# Patient Record
Sex: FEMALE | Race: WHITE | NOT HISPANIC OR LATINO | Employment: OTHER | URBAN - NONMETROPOLITAN AREA
[De-identification: names, ages, dates, MRNs, and addresses within clinical notes are randomized per-mention and may not be internally consistent; named-entity substitution may affect disease eponyms.]

---

## 2023-10-31 PROBLEM — M54.31 RIGHT SIDED SCIATICA: Status: ACTIVE | Noted: 2023-10-31

## 2023-10-31 PROBLEM — F17.200 TOBACCO USE DISORDER: Status: ACTIVE | Noted: 2023-10-31

## 2023-10-31 PROBLEM — R42 VERTIGO: Status: ACTIVE | Noted: 2023-10-31

## 2023-10-31 PROBLEM — N94.6 PAINFUL MENSTRUATION: Status: ACTIVE | Noted: 2023-10-31

## 2023-10-31 PROBLEM — R06.00 DYSPNEA: Status: ACTIVE | Noted: 2023-10-31

## 2023-10-31 PROBLEM — K58.9 IRRITABLE BOWEL SYNDROME: Status: ACTIVE | Noted: 2023-10-31

## 2023-10-31 PROBLEM — E66.9 OBESITY (BMI 30-39.9): Status: ACTIVE | Noted: 2023-10-31

## 2023-10-31 PROBLEM — I44.7 LEFT BUNDLE-BRANCH BLOCK: Status: ACTIVE | Noted: 2023-10-31

## 2023-10-31 PROBLEM — N20.0 NEPHROLITHIASIS: Status: ACTIVE | Noted: 2023-10-31

## 2023-10-31 PROBLEM — I10 PRIMARY HYPERTENSION: Status: ACTIVE | Noted: 2023-10-31

## 2023-10-31 PROBLEM — N20.1 URETERIC STONE: Status: ACTIVE | Noted: 2023-10-31

## 2023-10-31 PROBLEM — I49.3 PREMATURE VENTRICULAR CONTRACTIONS: Status: ACTIVE | Noted: 2023-10-31

## 2023-10-31 PROBLEM — L20.9 ATOPIC DERMATITIS: Status: ACTIVE | Noted: 2023-10-31

## 2023-10-31 PROBLEM — B36.0 TINEA VERSICOLOR: Status: ACTIVE | Noted: 2023-10-31

## 2023-10-31 PROBLEM — I10 BENIGN ESSENTIAL HYPERTENSION: Status: ACTIVE | Noted: 2023-10-31

## 2023-10-31 PROBLEM — E55.9 VITAMIN D DEFICIENCY: Status: ACTIVE | Noted: 2023-10-31

## 2023-10-31 RX ORDER — NAPROXEN 500 MG/1
TABLET ORAL
COMMUNITY
End: 2023-11-01 | Stop reason: ALTCHOICE

## 2023-10-31 RX ORDER — ONDANSETRON 4 MG/1
TABLET, FILM COATED ORAL
COMMUNITY
End: 2023-11-01 | Stop reason: ALTCHOICE

## 2023-10-31 RX ORDER — MECLIZINE HYDROCHLORIDE 25 MG/1
TABLET ORAL
COMMUNITY
Start: 2015-07-16 | End: 2023-11-01 | Stop reason: ALTCHOICE

## 2023-10-31 RX ORDER — LISINOPRIL 10 MG/1
TABLET ORAL
COMMUNITY
Start: 2013-10-23 | End: 2023-11-01 | Stop reason: SDUPTHER

## 2023-10-31 RX ORDER — OXYCODONE AND ACETAMINOPHEN 5; 325 MG/1; MG/1
TABLET ORAL
COMMUNITY
End: 2023-11-01 | Stop reason: ALTCHOICE

## 2023-10-31 RX ORDER — NORGESTIMATE AND ETHINYL ESTRADIOL 0.25-0.035
KIT ORAL
COMMUNITY
Start: 2013-09-29 | End: 2023-11-01 | Stop reason: ALTCHOICE

## 2023-10-31 RX ORDER — MEXILETINE HYDROCHLORIDE 150 MG/1
1 CAPSULE ORAL 2 TIMES DAILY
COMMUNITY
Start: 2014-04-29 | End: 2023-11-01 | Stop reason: ALTCHOICE

## 2023-10-31 RX ORDER — KETOCONAZOLE 200 MG/1
1 TABLET ORAL DAILY
COMMUNITY
Start: 2015-05-12 | End: 2023-11-01 | Stop reason: ALTCHOICE

## 2023-11-01 ENCOUNTER — OFFICE VISIT (OUTPATIENT)
Dept: PRIMARY CARE | Facility: CLINIC | Age: 48
End: 2023-11-01
Payer: COMMERCIAL

## 2023-11-01 VITALS
DIASTOLIC BLOOD PRESSURE: 82 MMHG | SYSTOLIC BLOOD PRESSURE: 146 MMHG | HEART RATE: 97 BPM | BODY MASS INDEX: 41.24 KG/M2 | WEIGHT: 271.2 LBS

## 2023-11-01 DIAGNOSIS — B35.1 ONYCHOMYCOSIS OF GREAT TOE: ICD-10-CM

## 2023-11-01 DIAGNOSIS — Z12.11 ENCOUNTER FOR SCREENING FOR MALIGNANT NEOPLASM OF COLON: ICD-10-CM

## 2023-11-01 DIAGNOSIS — I10 BENIGN ESSENTIAL HYPERTENSION: Primary | ICD-10-CM

## 2023-11-01 PROCEDURE — 99214 OFFICE O/P EST MOD 30 MIN: CPT | Performed by: FAMILY MEDICINE

## 2023-11-01 PROCEDURE — 3079F DIAST BP 80-89 MM HG: CPT | Performed by: FAMILY MEDICINE

## 2023-11-01 PROCEDURE — 3077F SYST BP >= 140 MM HG: CPT | Performed by: FAMILY MEDICINE

## 2023-11-01 RX ORDER — TAVABOROLE TOPICAL SOLUTION, 5% 43.5 MG/ML
1 SOLUTION TOPICAL DAILY
Qty: 4 ML | Refills: 5 | Status: SHIPPED | OUTPATIENT
Start: 2023-11-01 | End: 2024-05-17 | Stop reason: ALTCHOICE

## 2023-11-01 RX ORDER — LISINOPRIL 10 MG/1
10 TABLET ORAL DAILY
Qty: 90 TABLET | Refills: 1 | Status: SHIPPED | OUTPATIENT
Start: 2023-11-01 | End: 2024-05-15

## 2023-11-01 ASSESSMENT — ENCOUNTER SYMPTOMS
APPETITE CHANGE: 0
DEPRESSION: 0
COUGH: 0
LIGHT-HEADEDNESS: 0
HEADACHES: 0
NAUSEA: 0
ABDOMINAL PAIN: 0
CONSTIPATION: 0
DIFFICULTY URINATING: 0
ACTIVITY CHANGE: 0
DIZZINESS: 0
DIARRHEA: 0
ANAL BLEEDING: 0
WOUND: 0
OCCASIONAL FEELINGS OF UNSTEADINESS: 0
PALPITATIONS: 0
SHORTNESS OF BREATH: 0
LOSS OF SENSATION IN FEET: 0

## 2023-11-01 ASSESSMENT — PATIENT HEALTH QUESTIONNAIRE - PHQ9
SUM OF ALL RESPONSES TO PHQ9 QUESTIONS 1 AND 2: 0
1. LITTLE INTEREST OR PLEASURE IN DOING THINGS: NOT AT ALL
2. FEELING DOWN, DEPRESSED OR HOPELESS: NOT AT ALL

## 2023-11-01 ASSESSMENT — PAIN SCALES - GENERAL: PAINLEVEL: 0-NO PAIN

## 2023-11-01 NOTE — PROGRESS NOTES
Subjective   Patient ID: Liat Boyd is a 48 y.o. female who presents for 6 mo follow up.    She presents today for her regular follow-up for her high blood pressure.  She states that she does check her blood pressure at home fairly regularly and is usually around 120/80.  She states today, because of this, she is worried because she has a new  out there.  She thinks that may be affecting this some.  Her blood pressure was originally 150/93 when she arrived but came down to the current level.    She remains active.  She works out regularly.    She does have some thickened toenail on the left great toenail and would like treatment for this.  Is become a bit painful since has become so thick.    She is up-to-date on her mammogram.  She is not yet had a colonoscopy.  She has no family history of colon cancer.  No constipation, diarrhea, or blood in the stool.           Review of Systems   Constitutional:  Negative for activity change and appetite change.   HENT:  Negative for congestion.    Eyes:  Negative for visual disturbance.   Respiratory:  Negative for cough and shortness of breath.    Cardiovascular:  Negative for chest pain, palpitations and leg swelling.   Gastrointestinal:  Negative for abdominal pain, anal bleeding, constipation, diarrhea and nausea.   Genitourinary:  Negative for difficulty urinating.   Skin:  Negative for rash and wound.   Neurological:  Negative for dizziness, light-headedness and headaches.       Objective   /82   Pulse 97   Wt 123 kg (271 lb 3.2 oz)   BMI 41.24 kg/m²     Physical Exam  Constitutional:       Appearance: Normal appearance.   Neck:      Thyroid: No thyromegaly or thyroid tenderness.      Vascular: No carotid bruit.   Cardiovascular:      Rate and Rhythm: Normal rate and regular rhythm.      Heart sounds: No murmur heard.  Pulmonary:      Effort: Pulmonary effort is normal.      Breath sounds: Normal breath sounds.   Musculoskeletal:      Cervical  back: Neck supple.   Skin:     Comments: Her left great toenail is thickened quite a bit and yellow.  No surrounding redness.   Neurological:      Mental Status: She is alert.   Psychiatric:         Mood and Affect: Mood normal.         Assessment/Plan   Diagnoses and all orders for this visit:  Benign essential hypertension  -     Comprehensive Metabolic Panel; Future  -     Lipid Panel; Future  -     lisinopril 10 mg tablet; Take 1 tablet (10 mg) by mouth once daily.  Onychomycosis of great toe  -     tavaborole 5 % solution with applicator; Apply 1 Application topically once daily.  Encounter for screening for malignant neoplasm of colon  -     Cologuard® colon cancer screening; Future  Overall, doing well.  She will continue her regular physical activity.  She will continue to follow her blood pressures at home and call me if they are consistently above 140/90.  Sounds like the weather and her new  have her a little bit on edge today.    We used the medication for her toenails.  She will let me know if that not effective.  I did tell her that this will take many months usually to resolve this but to expect it to clear from the base out.  If labs not covered may consider using Diflucan.    We will do the Cologuard since she has no family history of colon cancer and is low risk.  Her mammograms are up-to-date, she states.

## 2023-11-01 NOTE — PATIENT INSTRUCTIONS
You bP is higher than your goal of <140/90. You've had some weather stress today. Follow at home and call me if higher than that value.     UP to date on mammograms. I'll send the cologuard test.     Use the medication on your toenails daily. Expect to see them clearing from the base and moving out.

## 2023-11-10 ENCOUNTER — HOSPITAL ENCOUNTER (OUTPATIENT)
Dept: RADIOLOGY | Facility: HOSPITAL | Age: 48
Discharge: HOME | End: 2023-11-10
Payer: COMMERCIAL

## 2023-11-10 ENCOUNTER — TELEPHONE (OUTPATIENT)
Dept: PRIMARY CARE | Facility: CLINIC | Age: 48
End: 2023-11-10
Payer: COMMERCIAL

## 2023-11-10 DIAGNOSIS — R92.8 ABNORMAL MAMMOGRAM OF RIGHT BREAST: Primary | ICD-10-CM

## 2023-11-10 DIAGNOSIS — R92.8 OTHER ABNORMAL AND INCONCLUSIVE FINDINGS ON DIAGNOSTIC IMAGING OF BREAST: ICD-10-CM

## 2023-11-10 PROCEDURE — 76642 ULTRASOUND BREAST LIMITED: CPT | Mod: RT

## 2023-11-10 NOTE — TELEPHONE ENCOUNTER
I called her and left message that her mammogram was benign, but that they still recommend a recheck of the right breast in 6 months. I will enter the order. I asked her to call back to confirm this.

## 2024-03-28 ENCOUNTER — TELEPHONE (OUTPATIENT)
Dept: PRIMARY CARE | Facility: CLINIC | Age: 49
End: 2024-03-28
Payer: COMMERCIAL

## 2024-03-28 DIAGNOSIS — Z12.39 BREAST SCREENING: Primary | ICD-10-CM

## 2024-03-28 DIAGNOSIS — R92.8 ABNORMAL MAMMOGRAM OF RIGHT BREAST: ICD-10-CM

## 2024-03-28 NOTE — TELEPHONE ENCOUNTER
Patient calls, she was supposed to have had additional testing following her mammogram, she did not do it, now she is due for annual mammogram and would like to have the order placed.

## 2024-04-01 NOTE — TELEPHONE ENCOUNTER
Message left for patient to call this office   What Is The Reason For Today's Visit?: History of Melanoma Year Excised?: 2012, 2020

## 2024-05-10 ENCOUNTER — APPOINTMENT (OUTPATIENT)
Dept: RADIOLOGY | Facility: HOSPITAL | Age: 49
End: 2024-05-10
Payer: COMMERCIAL

## 2024-05-11 DIAGNOSIS — I10 BENIGN ESSENTIAL HYPERTENSION: ICD-10-CM

## 2024-05-13 ENCOUNTER — HOSPITAL ENCOUNTER (OUTPATIENT)
Dept: RADIOLOGY | Facility: HOSPITAL | Age: 49
Discharge: HOME | End: 2024-05-13
Payer: COMMERCIAL

## 2024-05-13 VITALS — WEIGHT: 265 LBS | HEIGHT: 68 IN | BODY MASS INDEX: 40.16 KG/M2

## 2024-05-13 DIAGNOSIS — R92.8 ABNORMAL MAMMOGRAM OF RIGHT BREAST: ICD-10-CM

## 2024-05-13 DIAGNOSIS — R92.8 ABNORMAL FINDING ON BREAST IMAGING: ICD-10-CM

## 2024-05-13 PROCEDURE — 77061 BREAST TOMOSYNTHESIS UNI: CPT | Performed by: RADIOLOGY

## 2024-05-13 PROCEDURE — 76642 ULTRASOUND BREAST LIMITED: CPT | Performed by: RADIOLOGY

## 2024-05-13 PROCEDURE — 77066 DX MAMMO INCL CAD BI: CPT | Performed by: RADIOLOGY

## 2024-05-13 PROCEDURE — 76982 USE 1ST TARGET LESION: CPT | Mod: RT

## 2024-05-13 PROCEDURE — 76642 ULTRASOUND BREAST LIMITED: CPT | Mod: RT

## 2024-05-13 PROCEDURE — 77062 BREAST TOMOSYNTHESIS BI: CPT

## 2024-05-15 RX ORDER — LISINOPRIL 10 MG/1
10 TABLET ORAL DAILY
Qty: 90 TABLET | Refills: 1 | Status: SHIPPED | OUTPATIENT
Start: 2024-05-15

## 2024-05-17 ENCOUNTER — OFFICE VISIT (OUTPATIENT)
Dept: PRIMARY CARE | Facility: CLINIC | Age: 49
End: 2024-05-17
Payer: COMMERCIAL

## 2024-05-17 VITALS
HEIGHT: 67 IN | WEIGHT: 278.6 LBS | OXYGEN SATURATION: 97 % | SYSTOLIC BLOOD PRESSURE: 138 MMHG | DIASTOLIC BLOOD PRESSURE: 88 MMHG | BODY MASS INDEX: 43.73 KG/M2 | HEART RATE: 104 BPM

## 2024-05-17 DIAGNOSIS — R53.83 OTHER FATIGUE: ICD-10-CM

## 2024-05-17 DIAGNOSIS — F17.200 SMOKER: ICD-10-CM

## 2024-05-17 DIAGNOSIS — Z00.00 WELLNESS EXAMINATION: Primary | ICD-10-CM

## 2024-05-17 DIAGNOSIS — I10 BENIGN ESSENTIAL HYPERTENSION: ICD-10-CM

## 2024-05-17 DIAGNOSIS — Z12.11 ENCOUNTER FOR SCREENING FOR MALIGNANT NEOPLASM OF COLON: ICD-10-CM

## 2024-05-17 DIAGNOSIS — K58.0 IRRITABLE BOWEL SYNDROME WITH DIARRHEA: ICD-10-CM

## 2024-05-17 DIAGNOSIS — N95.1 PERIMENOPAUSAL: ICD-10-CM

## 2024-05-17 DIAGNOSIS — R40.0 DAYTIME SOMNOLENCE: ICD-10-CM

## 2024-05-17 DIAGNOSIS — B35.1 ONYCHOMYCOSIS OF GREAT TOE: ICD-10-CM

## 2024-05-17 DIAGNOSIS — E55.9 VITAMIN D DEFICIENCY: ICD-10-CM

## 2024-05-17 PROCEDURE — 3075F SYST BP GE 130 - 139MM HG: CPT | Performed by: FAMILY MEDICINE

## 2024-05-17 PROCEDURE — 3079F DIAST BP 80-89 MM HG: CPT | Performed by: FAMILY MEDICINE

## 2024-05-17 PROCEDURE — 99396 PREV VISIT EST AGE 40-64: CPT | Performed by: FAMILY MEDICINE

## 2024-05-17 RX ORDER — TERBINAFINE HYDROCHLORIDE 250 MG/1
250 TABLET ORAL DAILY
Qty: 30 TABLET | Refills: 0 | Status: SHIPPED | OUTPATIENT
Start: 2024-05-17 | End: 2024-06-16

## 2024-05-17 ASSESSMENT — PAIN SCALES - GENERAL: PAINLEVEL: 0-NO PAIN

## 2024-05-17 ASSESSMENT — PATIENT HEALTH QUESTIONNAIRE - PHQ9
2. FEELING DOWN, DEPRESSED OR HOPELESS: NOT AT ALL
1. LITTLE INTEREST OR PLEASURE IN DOING THINGS: NOT AT ALL
SUM OF ALL RESPONSES TO PHQ9 QUESTIONS 1 AND 2: 0

## 2024-05-17 NOTE — PROGRESS NOTES
"Subjective   Patient ID: Liat Boyd is a 48 y.o. female who presents for Annual Exam.    HPI   She presents today for her wellness exam.  Irritable bowel okay.  Blood pressure is controlled here today.  She continues to have onychomycosis in that left great toe.  She was using Jublia but it was irritating her skin around the area so she stopped it.  She like to continue to work on this.    She think she has sleep apnea.  She has daytime somnolence, was easily falsely normal anywhere.  She is fatigued.  She states that her  says that she snores loudly.  He is not sure that she stops breathing because they have allowed white noise machine.    She continues to smoke.  She is can have a biopsy on her breast months to finish that before she considers quitting smoking.  She is due for lab test.    Is also perimenopausal.  Still having her period but she feels hot all the time.  She has had irrigation at home to the point where her  is wearing sweatpants and sweaters.  Review of Systems    Objective   /88   Pulse 104   Ht 1.702 m (5' 7\")   Wt 126 kg (278 lb 9.6 oz)   LMP  (LMP Unknown)   SpO2 97%   BMI 43.63 kg/m²     Physical Exam  Constitutional:       Appearance: Normal appearance.   HENT:      Right Ear: Tympanic membrane and external ear normal.      Left Ear: Tympanic membrane and external ear normal.      Nose: Nose normal.   Eyes:      Conjunctiva/sclera: Conjunctivae normal.   Neck:      Thyroid: No thyromegaly or thyroid tenderness.      Vascular: No carotid bruit.   Cardiovascular:      Rate and Rhythm: Normal rate and regular rhythm.      Heart sounds: No murmur heard.  Pulmonary:      Effort: Pulmonary effort is normal.      Breath sounds: Normal breath sounds.   Musculoskeletal:      Cervical back: Neck supple.   Skin:     General: Skin is warm and dry.      Comments: Left great toenail is thickened with white streaks going up the medial border the nail.   Neurological:      " Mental Status: She is alert.   Psychiatric:         Mood and Affect: Mood normal.         Assessment/Plan   Diagnoses and all orders for this visit:  Benign essential hypertension  -     Comprehensive Metabolic Panel; Future  -     Lipid Panel; Future  Vitamin D deficiency  Irritable bowel syndrome with diarrhea  Smoker  Onychomycosis of great toe  -     terbinafine (LamISIL) 250 mg tablet; Take 1 tablet (250 mg) by mouth once daily.  Daytime somnolence  -     Home sleep apnea test (HSAT); Future  Other fatigue  -     Home sleep apnea test (HSAT); Future  -     CBC; Future  -     TSH with reflex to Free T4 if abnormal; Future  Encounter for screening for malignant neoplasm of colon  -     Cologuard® colon cancer screening; Future  Perimenopausal  We can do a home sleep study.  They should call you from late to get you set up for that machine.  If they do not call by next week, call hospital.  You may use the Lamisil for your toe fungus.  Take that for months and then recheck your liver function test.  As long as you are okay, will complete 3 months which should push the nail out slowly to clears from the anxiety toward the end.   Hot feeling, he may use them.  She, 3 twice a day, and see how you do with that over the next 2 to 3 weeks.  Will send a Cologuard test your house.  Follow directions and I will screen for colon cancer since you are low risk.  Check the blood tests for the next week and I will let you know how those come out.  Set up with the [employer] application so he can send me messages.

## 2024-05-17 NOTE — PATIENT INSTRUCTIONS
We can do a home sleep study.  They should call you from late to get you set up for that machine.  If they do not call by next week, call hospital.  You may use the Lamisil for your toe fungus.  Take that for months and then recheck your liver function test.  As long as you are okay, will complete 3 months which should push the nail out slowly to clears from the anxiety toward the end.   Hot feeling, he may use them.  She, 3 twice a day, and see how you do with that over the next 2 to 3 weeks.  Will send a Cologuard test your house.  Follow directions and I will screen for colon cancer since you are low risk.  Check the blood tests for the next week and I will let you know how those come out.  Set up with the [employer] application so he can send me messages.

## 2024-05-20 ENCOUNTER — LAB (OUTPATIENT)
Dept: LAB | Facility: LAB | Age: 49
End: 2024-05-20
Payer: COMMERCIAL

## 2024-05-20 DIAGNOSIS — R53.83 OTHER FATIGUE: ICD-10-CM

## 2024-05-20 DIAGNOSIS — I10 BENIGN ESSENTIAL HYPERTENSION: ICD-10-CM

## 2024-05-20 LAB
ALBUMIN SERPL BCP-MCNC: 4 G/DL (ref 3.4–5)
ALP SERPL-CCNC: 81 U/L (ref 33–110)
ALT SERPL W P-5'-P-CCNC: 10 U/L (ref 7–45)
ANION GAP SERPL CALC-SCNC: 14 MMOL/L (ref 10–20)
AST SERPL W P-5'-P-CCNC: 9 U/L (ref 9–39)
BILIRUB SERPL-MCNC: 0.3 MG/DL (ref 0–1.2)
BUN SERPL-MCNC: 12 MG/DL (ref 6–23)
CALCIUM SERPL-MCNC: 9 MG/DL (ref 8.6–10.3)
CHLORIDE SERPL-SCNC: 103 MMOL/L (ref 98–107)
CHOLEST SERPL-MCNC: 210 MG/DL (ref 0–199)
CHOLESTEROL/HDL RATIO: 6.5
CO2 SERPL-SCNC: 24 MMOL/L (ref 21–32)
CREAT SERPL-MCNC: 0.56 MG/DL (ref 0.5–1.05)
EGFRCR SERPLBLD CKD-EPI 2021: >90 ML/MIN/1.73M*2
ERYTHROCYTE [DISTWIDTH] IN BLOOD BY AUTOMATED COUNT: 14.3 % (ref 11.5–14.5)
GLUCOSE SERPL-MCNC: 122 MG/DL (ref 74–99)
HCT VFR BLD AUTO: 43.1 % (ref 36–46)
HDLC SERPL-MCNC: 32.2 MG/DL
HGB BLD-MCNC: 13.6 G/DL (ref 12–16)
LDLC SERPL CALC-MCNC: 153 MG/DL
MCH RBC QN AUTO: 27.8 PG (ref 26–34)
MCHC RBC AUTO-ENTMCNC: 31.6 G/DL (ref 32–36)
MCV RBC AUTO: 88 FL (ref 80–100)
NON HDL CHOLESTEROL: 178 MG/DL (ref 0–149)
NRBC BLD-RTO: 0 /100 WBCS (ref 0–0)
PLATELET # BLD AUTO: 293 X10*3/UL (ref 150–450)
POTASSIUM SERPL-SCNC: 4.2 MMOL/L (ref 3.5–5.3)
PROT SERPL-MCNC: 6.5 G/DL (ref 6.4–8.2)
RBC # BLD AUTO: 4.89 X10*6/UL (ref 4–5.2)
SODIUM SERPL-SCNC: 137 MMOL/L (ref 136–145)
TRIGL SERPL-MCNC: 123 MG/DL (ref 0–149)
TSH SERPL-ACNC: 2.77 MIU/L (ref 0.44–3.98)
VLDL: 25 MG/DL (ref 0–40)
WBC # BLD AUTO: 8.3 X10*3/UL (ref 4.4–11.3)

## 2024-05-20 PROCEDURE — 36415 COLL VENOUS BLD VENIPUNCTURE: CPT

## 2024-05-31 NOTE — PROGRESS NOTES
BREAST SURGERY NEW PATIENT VISIT    Subjective   Liat Boyd is a 48 y.o. female referred by Dr. Maurisio Novak  for  abnormal mammogram.  She denies breast pain, breast lumps or nipple discharge. She denies any change in the skin of the breast or the contour of the breast.       Bilateral Mammogram: 5/23/24:  Density:  The breast tissue is heterogeneously dense, which may  obscure small masses.      Right-sided breast densities are stable.  Left-sided breast densities are either stable or have regressed  compared to the previous study. No new masses are identified on  either side.      ULTRASOUND FINDINGS: The lesion at the 6 o'clock position right  breast with the distance nipple of 4 cm is somewhat irregular in  contour with slightly ill-defined margins and measures 0.5 x 1.1 x  1.2 cm, without internal vascularity, soft on elastography. The  morphology of this lesion is not classically benign and for this  reason I would recommend ultrasound-guided biopsy for more definitive  assessment.      Evaluation of the right axilla demonstrates a single lymph node with  a slightly thickened cortex measuring up to 5 mm thick. Biopsy of  this may be appropriate as well.      There is a small probable lymph node identified at the 8 o'clock  position right breast with a distance from nipple of 9 cm measuring 3  x 3 x 7 mm. This is not suspicious in appearance.      IMPRESSION:  Biopsy of the complex appearing macrolobulated partially ill-defined  lesion at the 6 o'clock position right breast, 4 cm from the nipple  is recommended at this time.. There is also a prominent lymph node  within the right axilla with a cortical thickness of 5 mm for which  biopsy is suggested as well.  Radiology review: All images and reports were personally reviewed.       Past Medical History: Hypertension    Smoker    GYN History:  Menarche: 10  Menopause: n/a  HRT: n/a  Birth control use: no  Fertility treatment: no  Hysterectomy:  no  LMP: /24    G 1 P 1  Age at First Live Birth: 30  Breast-feedin mo    Previous biopsies: No  Previous breast surgeries: No  Prior breast cancer: No    Family history:   None      Review of Systems   Constitutional symptoms: Denies generalized fatigue.  Denies weight change, fevers/chills, difficulty sleeping   Eyes: Denies double vision, glaucoma, cataracts.  Ear/nose/throat/mouth: Denies hearing changes, sore throat, sinus problems.  Cardiovascular: No chest pain.  Denies irregular heartbeat.  Denies ankle swelling.  Respiratory: No wheezing, cough, or shortness of breath.  Gastrointestinal: No abdominal pain,  No nausea/vomiting.  No indigestion/heartburn.  No change in bowel habits.  No constipation or diarrhea.   Genitourinary: No urinary incontinence.  No urinary frequency.  No painful urination.  Musculoskeletal: No bone pain, no muscle pain, no joint pain.   Integumentary: No rash. No masses.  No changes in moles.  No easy bruising.  Neurological: No headaches.  No tremors. No numbness/tingling.  Psychiatric: No anxiety. No depression.  Endocrine: No excessive thirst.  Not too hot or too cold.  Not tired or fatigued.    Hematological/lymphatic: No swollen glands or blood clotting problems.  No bruising.    PHYSICAL EXAM:    General: Alert and oriented x 3.  Mood and affect are appropriate.  HEENT: EOMI, PERRLA.   Neck: supple, no masses, no cervical adenopathy.  Cardiovascular: no lower extremity edema.  Regular rhythm.  Pulmonary: breathing non labored on room air.  Clear to auscultation.  GI: Abdomen soft, no masses. No hepatomegaly or splenomegaly.  Lymph nodes: No supraclavicular or axillary adenopathy bilaterally.  Musculoskeletal: Full range of motion in the upper extremities bilaterally.  Neuro: denies dizziness, tremors  Physical Exam  Chest:      Comments: Lymph node exam shows no cervical, supraclavicular, or axillary lymphadenopathy.  Breast exam shows symmetric breasts bilaterally with  no skin changes, no dominant masses and no nipple discharge in either breast.        Assessment/Plan     Abnormal right mammogram  1.2 cm mass in the right breast 6 o'clock position, 4 cm from the nipple and an abnormal right axillary lymph node    Your ultrasound showed abnormal findings. We have scheduled an ultrasound-guided core biopsy of a mass in the breast and the lymph node under the right arm. This is a needle biopsy that will be done by a radiologist in our [facility]. Possible results are benign, atypical or cancer.   Bruising and mild discomfort after the biopsy are normal and will improve.   I will call you after the biopsy with the results. If you did not hear from me within 7 days of the procedure, please call the office for the results.   I will provide further recommendations based on the results of the biopsy.    The risks, benefits, and procedures were reviewed with the patient. I will call her when the results are available.   She understands that if the pathology results show any atypical, abnormal or cancer cells, she will need additional surgery.    The biopsy is scheduled for June 12, 2024 at [facility].      Aye Wilkins MD

## 2024-06-04 ENCOUNTER — PROCEDURE VISIT (OUTPATIENT)
Dept: SURGICAL ONCOLOGY | Facility: CLINIC | Age: 49
End: 2024-06-04
Payer: COMMERCIAL

## 2024-06-04 VITALS
BODY MASS INDEX: 43.22 KG/M2 | RESPIRATION RATE: 18 BRPM | HEART RATE: 105 BPM | TEMPERATURE: 98.1 F | HEIGHT: 67 IN | DIASTOLIC BLOOD PRESSURE: 91 MMHG | WEIGHT: 275.4 LBS | SYSTOLIC BLOOD PRESSURE: 142 MMHG

## 2024-06-04 DIAGNOSIS — R92.8 ABNORMAL FINDINGS ON DIAGNOSTIC IMAGING OF BREAST: Primary | ICD-10-CM

## 2024-06-04 PROCEDURE — 99204 OFFICE O/P NEW MOD 45 MIN: CPT | Performed by: SURGERY

## 2024-06-04 PROCEDURE — 99214 OFFICE O/P EST MOD 30 MIN: CPT | Performed by: SURGERY

## 2024-06-04 ASSESSMENT — COLUMBIA-SUICIDE SEVERITY RATING SCALE - C-SSRS
6. HAVE YOU EVER DONE ANYTHING, STARTED TO DO ANYTHING, OR PREPARED TO DO ANYTHING TO END YOUR LIFE?: NO
1. IN THE PAST MONTH, HAVE YOU WISHED YOU WERE DEAD OR WISHED YOU COULD GO TO SLEEP AND NOT WAKE UP?: NO
2. HAVE YOU ACTUALLY HAD ANY THOUGHTS OF KILLING YOURSELF?: NO

## 2024-06-04 ASSESSMENT — PAIN SCALES - GENERAL: PAINLEVEL: 0-NO PAIN

## 2024-06-04 ASSESSMENT — PATIENT HEALTH QUESTIONNAIRE - PHQ9
1. LITTLE INTEREST OR PLEASURE IN DOING THINGS: NOT AT ALL
2. FEELING DOWN, DEPRESSED OR HOPELESS: NOT AT ALL
SUM OF ALL RESPONSES TO PHQ9 QUESTIONS 1 AND 2: 0

## 2024-06-04 ASSESSMENT — ENCOUNTER SYMPTOMS
DEPRESSION: 0
OCCASIONAL FEELINGS OF UNSTEADINESS: 0
LOSS OF SENSATION IN FEET: 0

## 2024-06-12 ENCOUNTER — HOSPITAL ENCOUNTER (OUTPATIENT)
Dept: RADIOLOGY | Facility: HOSPITAL | Age: 49
Discharge: HOME | End: 2024-06-12
Payer: COMMERCIAL

## 2024-06-12 VITALS
HEART RATE: 85 BPM | OXYGEN SATURATION: 100 % | RESPIRATION RATE: 15 BRPM | DIASTOLIC BLOOD PRESSURE: 77 MMHG | SYSTOLIC BLOOD PRESSURE: 147 MMHG

## 2024-06-12 DIAGNOSIS — R92.8 ABNORMAL FINDING ON BREAST IMAGING: ICD-10-CM

## 2024-06-12 PROCEDURE — 19083 BX BREAST 1ST LESION US IMAG: CPT | Mod: RIGHT SIDE | Performed by: RADIOLOGY

## 2024-06-12 PROCEDURE — 19083 BX BREAST 1ST LESION US IMAG: CPT | Mod: RT

## 2024-06-12 PROCEDURE — A4648 IMPLANTABLE TISSUE MARKER: HCPCS

## 2024-06-12 PROCEDURE — 38505 NEEDLE BIOPSY LYMPH NODES: CPT | Mod: 53

## 2024-06-12 PROCEDURE — C1819 TISSUE LOCALIZATION-EXCISION: HCPCS

## 2024-06-12 PROCEDURE — 2720000007 HC OR 272 NO HCPCS

## 2024-06-12 PROCEDURE — 77065 DX MAMMO INCL CAD UNI: CPT | Mod: RIGHT SIDE | Performed by: RADIOLOGY

## 2024-06-12 PROCEDURE — 77065 DX MAMMO INCL CAD UNI: CPT | Mod: RT

## 2024-06-12 ASSESSMENT — PAIN SCALES - GENERAL
PAINLEVEL_OUTOF10: 0 - NO PAIN
PAINLEVEL_OUTOF10: 0 - NO PAIN

## 2024-06-12 ASSESSMENT — PAIN - FUNCTIONAL ASSESSMENT: PAIN_FUNCTIONAL_ASSESSMENT: 0-10

## 2024-06-12 NOTE — DISCHARGE INSTRUCTIONS
No heavy lifting for 24hours.  May drive.  Remove dressing and gauze tomorrow. Do not remove paper stitches- They fall off on their own.   May shower tomorrow.  May take tylenol as needed for pain. Apply ice.  Follow up with Dr. Wilkins In 10 days

## 2024-06-17 ENCOUNTER — LAB (OUTPATIENT)
Dept: LAB | Facility: LAB | Age: 49
End: 2024-06-17
Payer: COMMERCIAL

## 2024-06-17 DIAGNOSIS — I10 BENIGN ESSENTIAL HYPERTENSION: ICD-10-CM

## 2024-06-17 DIAGNOSIS — B35.1 ONYCHOMYCOSIS OF GREAT TOE: ICD-10-CM

## 2024-06-17 LAB
ALBUMIN SERPL BCP-MCNC: 4.1 G/DL (ref 3.4–5)
ALP SERPL-CCNC: 80 U/L (ref 33–110)
ALT SERPL W P-5'-P-CCNC: 11 U/L (ref 7–45)
ANION GAP SERPL CALC-SCNC: 15 MMOL/L (ref 10–20)
AST SERPL W P-5'-P-CCNC: 11 U/L (ref 9–39)
BILIRUB DIRECT SERPL-MCNC: 0.1 MG/DL (ref 0–0.3)
BILIRUB SERPL-MCNC: 0.4 MG/DL (ref 0–1.2)
BUN SERPL-MCNC: 12 MG/DL (ref 6–23)
CALCIUM SERPL-MCNC: 8.9 MG/DL (ref 8.6–10.3)
CHLORIDE SERPL-SCNC: 104 MMOL/L (ref 98–107)
CHOLEST SERPL-MCNC: 201 MG/DL (ref 0–199)
CHOLESTEROL/HDL RATIO: 6.2
CO2 SERPL-SCNC: 25 MMOL/L (ref 21–32)
CREAT SERPL-MCNC: 0.5 MG/DL (ref 0.5–1.05)
EGFRCR SERPLBLD CKD-EPI 2021: >90 ML/MIN/1.73M*2
GLUCOSE SERPL-MCNC: 106 MG/DL (ref 74–99)
HDLC SERPL-MCNC: 32.2 MG/DL
LDLC SERPL CALC-MCNC: 137 MG/DL
NON HDL CHOLESTEROL: 169 MG/DL (ref 0–149)
POTASSIUM SERPL-SCNC: 4.5 MMOL/L (ref 3.5–5.3)
PROT SERPL-MCNC: 6.5 G/DL (ref 6.4–8.2)
SODIUM SERPL-SCNC: 139 MMOL/L (ref 136–145)
TRIGL SERPL-MCNC: 157 MG/DL (ref 0–149)
VLDL: 31 MG/DL (ref 0–40)

## 2024-06-17 PROCEDURE — 36415 COLL VENOUS BLD VENIPUNCTURE: CPT

## 2024-06-17 PROCEDURE — 80053 COMPREHEN METABOLIC PANEL: CPT

## 2024-06-17 PROCEDURE — 82248 BILIRUBIN DIRECT: CPT

## 2024-06-17 PROCEDURE — 80061 LIPID PANEL: CPT

## 2024-06-18 ENCOUNTER — TELEPHONE (OUTPATIENT)
Dept: SURGICAL ONCOLOGY | Facility: CLINIC | Age: 49
End: 2024-06-18
Payer: COMMERCIAL

## 2024-06-18 DIAGNOSIS — B35.1 ONYCHOMYCOSIS OF GREAT TOE: ICD-10-CM

## 2024-06-18 LAB
LABORATORY COMMENT REPORT: NORMAL
PATH REPORT.FINAL DX SPEC: NORMAL
PATH REPORT.GROSS SPEC: NORMAL
PATH REPORT.TOTAL CANCER: NORMAL

## 2024-06-18 NOTE — TELEPHONE ENCOUNTER
Result Communication    Resulted Orders   Surgical Pathology Exam   Result Value Ref Range    Case Report       Surgical Pathology                                Case: [id number]                                  Authorizing Provider:  Aye Wilkins MD            Collected:           06/12/2024 0926              Ordering Location:     [facility]   Received:            06/12/2024 1045              Pathologist:           Lucinda Johnson MD PhD                                                               Specimen:    BREAST CORE BIOPSY RIGHT, US RIGHT BREAST BIOPSY                                           FINAL DIAGNOSIS       A. BREAST, RIGHT, 6:00, 4 CM FN, CORE BIOPSY:     -- Cystic dilatation of mammary gland.  -- Usual ductal hyperplasia and columnar cell change.  -- Pseudoangiomatous stromal hyperplasia.  -- See note.    Note: Deeper levels of sections were reviewed.              By the signature on this report, the individual or group listed as making the Final Interpretation/Diagnosis certifies that they have reviewed this case.       Gross Description       A: Received in formalin, labeled with the patient´s name and hospital number, are multiple irregular/cylindrical segments of yellow-white fatty soft tissue aggregating to 1.3 x 0.7 x 0.3 cm.  The specimen is submitted in toto in 2 cassettes.  MRS    NOTE:  Ischemia time: Not provided.  This specimen was placed into formalin at: 06/12/24 09:26.         12:48 PM      Results were successfully communicated with the patient and they acknowledged their understanding.  Biopsy results are benign (no cancer). Continue yearly mammograms.

## 2024-06-18 NOTE — RESULT ENCOUNTER NOTE
CMP is okay, with sugar mildly elevated. We can check a hgba1c here.   Cholesterol can use some work. Essentially, it is the HDL that is the issue since it's very low at 32. Is she exercising? Really needs 30-40 minutes of exercise per day. If she can do that we can then recheck this in 2-3 months and see how she's doing.

## 2024-06-19 RX ORDER — TERBINAFINE HYDROCHLORIDE 250 MG/1
250 TABLET ORAL DAILY
Qty: 30 TABLET | Refills: 1 | Status: SHIPPED | OUTPATIENT
Start: 2024-06-19 | End: 2024-08-18

## 2024-07-29 ENCOUNTER — PROCEDURE VISIT (OUTPATIENT)
Dept: SLEEP MEDICINE | Facility: CLINIC | Age: 49
End: 2024-07-29
Payer: COMMERCIAL

## 2024-07-29 VITALS — HEART RATE: 89 BPM | SYSTOLIC BLOOD PRESSURE: 134 MMHG | DIASTOLIC BLOOD PRESSURE: 91 MMHG

## 2024-07-29 DIAGNOSIS — R53.83 OTHER FATIGUE: ICD-10-CM

## 2024-07-29 DIAGNOSIS — R40.0 DAYTIME SOMNOLENCE: ICD-10-CM

## 2024-07-29 NOTE — PROGRESS NOTES
Type of Study: HOME SLEEP STUDY - NOMAD     The patient received equipment and instructions for use of the [facility] HSAT device. The patient was instructed how to apply the effort belts, cannula, thermistor. It was also explained how the Nomad and oximeter components work.  The patient was asked to record their sleep for an 8-hour period.     The patient was informed of their responsibility for the device and acknowledged this by signing the HSAT device contract. The patient was asked to return the device on 07/30/2024 between the hours of 7:30-15:00 to the Sleep Center.     The patient was instructed to call 911 as usual for any medical- emergencies while at home.  The patient was also given a phone number for troubleshooting when using the device in case there were additional questions.     Currently on BP medication, slightly elevated at time of appointment. No symptoms.    Neck circumference 15 inches

## 2024-09-25 DIAGNOSIS — I10 BENIGN ESSENTIAL HYPERTENSION: ICD-10-CM

## 2024-09-25 RX ORDER — LISINOPRIL 10 MG/1
10 TABLET ORAL DAILY
Qty: 90 TABLET | Refills: 1 | Status: SHIPPED | OUTPATIENT
Start: 2024-09-25

## 2025-03-20 PROCEDURE — 87624 HPV HI-RISK TYP POOLED RSLT: CPT

## 2025-03-20 PROCEDURE — 88175 CYTOPATH C/V AUTO FLUID REDO: CPT

## 2025-03-21 ENCOUNTER — OFFICE VISIT (OUTPATIENT)
Dept: PRIMARY CARE | Facility: CLINIC | Age: 50
End: 2025-03-21
Payer: COMMERCIAL

## 2025-03-21 VITALS
TEMPERATURE: 98.2 F | WEIGHT: 271.6 LBS | HEIGHT: 68 IN | OXYGEN SATURATION: 98 % | HEART RATE: 94 BPM | BODY MASS INDEX: 41.16 KG/M2 | DIASTOLIC BLOOD PRESSURE: 98 MMHG | SYSTOLIC BLOOD PRESSURE: 160 MMHG

## 2025-03-21 DIAGNOSIS — Z00.00 WELLNESS EXAMINATION: Primary | ICD-10-CM

## 2025-03-21 DIAGNOSIS — K58.0 IRRITABLE BOWEL SYNDROME WITH DIARRHEA: ICD-10-CM

## 2025-03-21 DIAGNOSIS — I10 BENIGN ESSENTIAL HYPERTENSION: ICD-10-CM

## 2025-03-21 DIAGNOSIS — Z12.11 ENCOUNTER FOR SCREENING FOR MALIGNANT NEOPLASM OF COLON: ICD-10-CM

## 2025-03-21 DIAGNOSIS — E04.9 ENLARGED THYROID: ICD-10-CM

## 2025-03-21 PROCEDURE — 99396 PREV VISIT EST AGE 40-64: CPT | Performed by: FAMILY MEDICINE

## 2025-03-21 PROCEDURE — 3077F SYST BP >= 140 MM HG: CPT | Performed by: FAMILY MEDICINE

## 2025-03-21 PROCEDURE — 3080F DIAST BP >= 90 MM HG: CPT | Performed by: FAMILY MEDICINE

## 2025-03-21 PROCEDURE — 3008F BODY MASS INDEX DOCD: CPT | Performed by: FAMILY MEDICINE

## 2025-03-21 ASSESSMENT — PAIN SCALES - GENERAL: PAINLEVEL_OUTOF10: 0-NO PAIN

## 2025-03-21 NOTE — PROGRESS NOTES
"Subjective   Patient ID: Liat Boyd is a 49 y.o. female who presents for Annual Exam, Breast Cancer Screening (Saw OBGYN yesterday and received the order), and Colonoscopy (Cologuard discuss).    HPI   She is here for her annual exam.  She did see her gynecologist yesterday and has a mammogram ordered there.  She never did the Cologuard.  She does have IBS, which gives her diarrhea if she eats the wrong foods.  She states this is a longstanding problem dating back to her teens.  No family history of colon cancer.      The medicine didn't help the toenails, but teatree oil has started to help it.     She states her blood pressure has been higher at home now, running in the 150s at times.  She does not get much exercise because she is helping her dad care for her mother with dementia.  If she leaves her mom tends to get upset.  This does not leave a lot of private time.    Review of Systems    Objective   BP (!) 160/98   Pulse 94   Temp 36.8 °C (98.2 °F)   Ht 1.715 m (5' 7.5\")   Wt 123 kg (271 lb 9.6 oz)   SpO2 98%   BMI 41.91 kg/m²     Physical Exam  Vitals reviewed.   Constitutional:       Appearance: Normal appearance.   HENT:      Right Ear: Tympanic membrane, ear canal and external ear normal.      Left Ear: Tympanic membrane, ear canal and external ear normal.      Nose: Nose normal.      Mouth/Throat:      Mouth: Mucous membranes are moist.      Pharynx: Oropharynx is clear.   Eyes:      Conjunctiva/sclera: Conjunctivae normal.   Neck:      Thyroid: Thyromegaly (Thyroid seems to palpate a little bit large.  It is nontender.) present. No thyroid tenderness.      Vascular: No carotid bruit.   Cardiovascular:      Rate and Rhythm: Normal rate and regular rhythm.      Heart sounds: No murmur heard.  Pulmonary:      Effort: Pulmonary effort is normal.      Breath sounds: Normal breath sounds.   Abdominal:      General: Abdomen is flat.      Palpations: Abdomen is soft. There is no mass.      " Tenderness: There is no abdominal tenderness.   Musculoskeletal:      Cervical back: Neck supple.      Right lower leg: No edema.      Left lower leg: No edema.   Lymphadenopathy:      Cervical: No cervical adenopathy.   Skin:     General: Skin is warm and dry.   Neurological:      Mental Status: She is alert.   Psychiatric:         Mood and Affect: Mood normal.         Assessment/Plan   Diagnoses and all orders for this visit:  Wellness examination  Benign essential hypertension  -     Comprehensive Metabolic Panel; Future  -     Lipid Panel; Future  -     CBC; Future  Irritable bowel syndrome with diarrhea  Encounter for screening for malignant neoplasm of colon  -     Cologuard® colon cancer screening; Future  Enlarged thyroid  -     US thyroid; Future  -     TSH with reflex to Free T4 if abnormal; Future  She will schedule a mammogram.  We discussed that since she does have IBS type symptoms, colonoscopy is indicated.  She does not want to do this.  She will do the Cologuard so we will at least get that done.  She is going to follow her blood pressure at home and increase the dose to 20 mg of lisinopril.  Were looking for less than 140/90.  I encouraged her to take a walk every day for 30 minutes if she can.  Even if she has to push her mom in a wheelchair for that amount of time.  This should help with the blood pressure but also should help her bring up her HDL cholesterol which has been low throwing off her cholesterol HDL ratio.  She will check her blood tests in a month after she is been on a regular walking routine.  Not sure if her thyroid is a bit large so we will check an ultrasound of that as well.  I will follow-up with her in 6 months here but she will keep me informed of the blood pressures.

## 2025-03-21 NOTE — PATIENT INSTRUCTIONS
Walk for 30-40 minutes a day. This can help bring upthe HDL cholesterol.   Check the blood tests in a month.     Will do the Cologuard, although colonoscopy would be better with you history IBS.     Increase to 20 mg of lisinopril and let me know where the BP's run this month.

## 2025-03-24 ENCOUNTER — LAB REQUISITION (OUTPATIENT)
Dept: LAB | Facility: HOSPITAL | Age: 50
End: 2025-03-24
Payer: COMMERCIAL

## 2025-03-24 ENCOUNTER — HOSPITAL ENCOUNTER (OUTPATIENT)
Dept: RADIOLOGY | Facility: HOSPITAL | Age: 50
Discharge: HOME | End: 2025-03-24
Payer: COMMERCIAL

## 2025-03-24 DIAGNOSIS — Z01.419 ENCOUNTER FOR GYNECOLOGICAL EXAMINATION (GENERAL) (ROUTINE) WITHOUT ABNORMAL FINDINGS: ICD-10-CM

## 2025-03-24 DIAGNOSIS — E04.9 ENLARGED THYROID: ICD-10-CM

## 2025-03-24 DIAGNOSIS — Z11.51 ENCOUNTER FOR SCREENING FOR HUMAN PAPILLOMAVIRUS (HPV): ICD-10-CM

## 2025-03-24 PROCEDURE — 76536 US EXAM OF HEAD AND NECK: CPT

## 2025-03-24 PROCEDURE — 76536 US EXAM OF HEAD AND NECK: CPT | Performed by: RADIOLOGY

## 2025-03-26 ENCOUNTER — TELEPHONE (OUTPATIENT)
Dept: OTOLARYNGOLOGY | Facility: CLINIC | Age: 50
End: 2025-03-26
Payer: COMMERCIAL

## 2025-03-26 ENCOUNTER — TELEPHONE (OUTPATIENT)
Dept: PRIMARY CARE | Facility: CLINIC | Age: 50
End: 2025-03-26
Payer: COMMERCIAL

## 2025-03-26 DIAGNOSIS — E04.1 THYROID NODULE: Primary | ICD-10-CM

## 2025-03-26 DIAGNOSIS — I10 BENIGN ESSENTIAL HYPERTENSION: ICD-10-CM

## 2025-03-26 RX ORDER — LISINOPRIL 20 MG/1
20 TABLET ORAL DAILY
Qty: 90 TABLET | Refills: 1 | Status: SHIPPED | OUTPATIENT
Start: 2025-03-26

## 2025-03-26 NOTE — TELEPHONE ENCOUNTER
Dr. Novak referred to Dr. Patrick for thyroid nodules. Dr. Patrick placed order for FNA with DEANNA Carpio at [facility] emailed patient info for scheduling. I spoke with patient and she will call to schedule once she has a biopsy date.

## 2025-03-26 NOTE — TELEPHONE ENCOUNTER
I called her to discuss her ultrasound thyroid.  Since there are 2 nodules that there potentially recommending FNA, I recommend that she see ENT to discuss this so there is somebody to follow this as well.  She is agreeable to that.  I will reach out to Dr. Patrick to see if he is able to handle this.    Also, she stated that her blood pressure on 20 mg of lisinopril is very good now, running in the 130s over 80s.    14:25  And message with Dr. Patrick who is going to set up her FNA and with interventional radiology since the ultrasound tech is out for a while.  I let her know that she would be getting a call from his office.

## 2025-04-16 ENCOUNTER — HOSPITAL ENCOUNTER (OUTPATIENT)
Dept: RADIOLOGY | Facility: HOSPITAL | Age: 50
Discharge: HOME | End: 2025-04-16
Payer: COMMERCIAL

## 2025-04-16 VITALS
OXYGEN SATURATION: 98 % | TEMPERATURE: 97.9 F | RESPIRATION RATE: 16 BRPM | DIASTOLIC BLOOD PRESSURE: 81 MMHG | HEART RATE: 77 BPM | SYSTOLIC BLOOD PRESSURE: 134 MMHG

## 2025-04-16 DIAGNOSIS — E04.1 THYROID NODULE: ICD-10-CM

## 2025-04-16 PROCEDURE — 2500000004 HC RX 250 GENERAL PHARMACY W/ HCPCS (ALT 636 FOR OP/ED): Mod: JZ | Performed by: STUDENT IN AN ORGANIZED HEALTH CARE EDUCATION/TRAINING PROGRAM

## 2025-04-16 PROCEDURE — 88173 CYTOPATH EVAL FNA REPORT: CPT | Mod: TC | Performed by: OTOLARYNGOLOGY

## 2025-04-16 PROCEDURE — 10005 FNA BX W/US GDN 1ST LES: CPT

## 2025-04-16 PROCEDURE — 76942 ECHO GUIDE FOR BIOPSY: CPT

## 2025-04-16 RX ORDER — LIDOCAINE HYDROCHLORIDE 10 MG/ML
INJECTION, SOLUTION EPIDURAL; INFILTRATION; INTRACAUDAL; PERINEURAL
Status: COMPLETED | OUTPATIENT
Start: 2025-04-16 | End: 2025-04-16

## 2025-04-16 RX ADMIN — LIDOCAINE HYDROCHLORIDE 5 ML: 10 INJECTION, SOLUTION EPIDURAL; INFILTRATION; INTRACAUDAL; PERINEURAL at 10:53

## 2025-04-16 ASSESSMENT — PAIN SCALES - GENERAL
PAINLEVEL_OUTOF10: 0 - NO PAIN

## 2025-04-16 ASSESSMENT — COLUMBIA-SUICIDE SEVERITY RATING SCALE - C-SSRS
1. IN THE PAST MONTH, HAVE YOU WISHED YOU WERE DEAD OR WISHED YOU COULD GO TO SLEEP AND NOT WAKE UP?: NO
2. HAVE YOU ACTUALLY HAD ANY THOUGHTS OF KILLING YOURSELF?: NO
6. HAVE YOU EVER DONE ANYTHING, STARTED TO DO ANYTHING, OR PREPARED TO DO ANYTHING TO END YOUR LIFE?: NO

## 2025-04-16 ASSESSMENT — PAIN - FUNCTIONAL ASSESSMENT
PAIN_FUNCTIONAL_ASSESSMENT: 0-10
PAIN_FUNCTIONAL_ASSESSMENT: 0-10

## 2025-04-16 NOTE — DISCHARGE INSTRUCTIONS
Patient and Family Education  What is a Biopsy or Aspiration?  A biopsy or aspiration is used to help doctors diagnose disease. Small pieces of tissue or cells  are taken from the area using a special kind of needle. The tissue is sent to the lab to be looked  at under a microscope. The procedure can take up to 1 hour.  Before the Test:  ? If you take blood thinning medications, you Must ask your doctor when you should stop  taking them. You may need to stop taking the medicine up to seven (7) days prior to the  test.  ? Do Not Drink or Eat Anything after midnight the day before the test. You may take  medications with a small amount of clear liquid.  ? If you take daily oral diabetic medications, contact your Radiologist or your Radiology  Nurse to determine if you should take your medicine before the test or adjust the dosage.  ? Make plans for a ride home if you are an outpatient.  ? If you have an allergy to iodine or iodinated contrast, your doctor may prescribe special  pills to take before the test.  During the Test:  ? You will lie on a padded X-Ray table.  ? You may be given medicine that will make you drowsy.  ? You will also be given a local anesthetic to numb the biopsy site.  ? You will feel pressure during the biopsy.  After the Test:  ? You will remain on bed rest for 1 to 4 hours.  ? Your blood pressure, pulse and biopsy site will be checked often.  ? If a large sample of tissue needs to be taken, you may be admitted to the hospital for  observation.  Following these instructions for a safer recovery:     Page 2 of 2  Activity:  ? Limit your activity for 24 hours after the test.  ? Do not drive for 24 hours.  ? Do not do any heavy lifting, such as groceries, for 24 hours.  ? Avoid intense exercise and contact sports for 24 hours.  Diet:  ? You may resume your normal diet.  Medicines:  ? If you take medications to thin your blood, ask your doctor when you should start taking  them after the test.  ?  You may take your other medicines as ordered by your doctor.  Call your Doctor if you have:  ? Redness, swelling or pus-like drainage at the biopsy site.  ? Temperature of 100.4 F degrees or higher.  ? Increased pain or tenderness at the biopsy site.  ? Any questions.  Call your Doctor Right Away if you have any of the Signs:  ? Increase in pain in the biopsy site.  ? Dizziness or fainting.  ? Shortness of breath or trouble breathing.  ? Bleeding from the biopsy site.  If you are not able to contact your doctor, call 911 and/or go to the nearest hospital.     How to Reach your Doctor:  Call Dr. Patrick at [phone] with problems or questions.

## 2025-04-16 NOTE — Clinical Note
Pt. Positioned supine on stretcher in ultrasound room. Pt. A&OX3. Pt.denies any pain or discomfort at this time.

## 2025-04-16 NOTE — PRE-PROCEDURE NOTE
Interventional Radiology Preprocedure Note    Indication for procedure: The encounter diagnosis was Thyroid nodule.    Relevant review of systems: NA    Relevant Labs:   Lab Results   Component Value Date    CREATININE 0.50 06/17/2024    EGFR >90 06/17/2024       Planned Sedation/Anesthesia: Minimal    Airway assessment: normal    Directed physical examination:    GENERAL: awake, no acute distress  HEENT: AT/NC  NECK: supple  CV: RRR  PULM: non-labored breathing  ABDOMEN: soft, ND  NEURO: AAOx3  MSK: full ROM  SKIN: no rash    Benefits, risks and alternatives of procedure and planned sedation have been discussed with the patient and/or their representative. All questions answered and they agree to proceed.

## 2025-04-16 NOTE — POST-PROCEDURE NOTE
Interventional Radiology Brief Postprocedure Note    Attending: Lenny Martinez    Diagnosis: Thyroid nodules    Description of procedure: US guided 25G fine needle aspiration of a right inferior thyroid nodule and right superior thyroid nodule x4 (each)     Anesthesia:  Local    Complications: None    Estimated Blood Loss: minimal    Medications (Filter: Administrations occurring from 1035 to 1122 on 04/16/25) As of 04/16/25 1122      lidocaine PF (Xylocaine) 10 mg/mL (1 %) injection (mL) Total volume:  5 mL      Date/Time Rate/Dose/Volume Action       04/16/25  1053 5 mL Given                   ID Type Source Tests Collected by Time   1 : Right inferior thyroid biopsy Non-Gynecologic Cytology THYROID FINE NEEDLE ASPIRATION RIGHT INFERIOR LOBE CYTOLOGY CONSULTATION (NON-GYNECOLOGIC) Franklin Martinez MD 4/16/2025 1106   2 :  Non-Gynecologic Cytology THYROID FINE NEEDLE ASPIRATION RIGHT SUPERIOR LOBE CYTOLOGY CONSULTATION (NON-GYNECOLOGIC) Franklin Martinez MD 4/16/2025 1116         See detailed result report with images in PACS.    The patient tolerated the procedure well without incident or complication and is in stable condition.

## 2025-04-16 NOTE — Clinical Note
Right superior thyroid biopsy samples obtained. Pt. Tolerated well and denies any pain at this time. Band aide and ice pack applied to puncture site

## 2025-04-16 NOTE — Clinical Note
Right inferior thyroid biopsy samples obtained. Pt. Tolerated well and denies any pain at this time.

## 2025-04-17 LAB
LABORATORY COMMENT REPORT: NORMAL
LABORATORY COMMENT REPORT: NORMAL
PATH REPORT.FINAL DX SPEC: NORMAL
PATH REPORT.GROSS SPEC: NORMAL
PATH REPORT.TOTAL CANCER: NORMAL

## 2025-05-07 ENCOUNTER — APPOINTMENT (OUTPATIENT)
Dept: OTOLARYNGOLOGY | Facility: CLINIC | Age: 50
End: 2025-05-07
Payer: COMMERCIAL

## 2025-05-07 VITALS — HEIGHT: 68 IN | WEIGHT: 271 LBS | BODY MASS INDEX: 41.07 KG/M2

## 2025-05-07 DIAGNOSIS — E04.1 THYROID NODULE: Primary | ICD-10-CM

## 2025-05-07 PROCEDURE — 99204 OFFICE O/P NEW MOD 45 MIN: CPT | Performed by: OTOLARYNGOLOGY

## 2025-05-07 PROCEDURE — 3008F BODY MASS INDEX DOCD: CPT | Performed by: OTOLARYNGOLOGY

## 2025-05-07 NOTE — PROGRESS NOTES
"HPI  Liat Boyd is a 49 y.o. female kindly referred by Dr. Novak for evaluation of thyroid nodules.  She had an ultrasound done demonstrating TI-RADS 4 nodules on the right in the inferior and superior lobe over 1.5 cm.  She had FNA done under ultrasound guidance and the inferior lobe was nondiagnostic but the superior lobe looked benign.  She has not had radiation exposure.  She is clinically euthyroid.  Normal TSH May 2024.  No family history of thyroid cancer.    She has snoring but no dysphagia.  No other compressive symptoms.  She does not have sleep apnea based on polysomnogram      Medical History[1]         Medications:   Current Medications[2]     Allergies:  Allergies[3]     Physical Exam:  Last Recorded Vitals  Height 1.727 m (5' 8\"), weight 123 kg (271 lb).  General:     General appearance: Well-developed, well-nourished in no acute distress.       Voice:  normal       Head/face: Normal appearance; nontender to palpation     Facial nerve function: Normal and symmetric bilaterally.    Oral/oropharynx:     Oral vestibule: Normal labial and gingival mucosa     Tongue/floor of mouth: Normal without lesion     Oropharynx: Clear.  No lesions present of the hard/soft palate, posterior pharynx    Neck:     Neck: Normal appearance, trachea midline     Salivary glands: Normal to palpation bilaterally     Lymph nodes: No cervical lymphadenopathy to palpation     Thyroid: No thyromegaly.  Isthmus nodule, mobile, palpable     Range of motion: Normal    Neurological:     Cortical functions: Alert and oriented x3, appropriate affect       Larynx/hypopharynx:     Laryngeal findings: Mirror exam inadequate or limited secondary to enlarged base of tongue and/or excessive gagging    Ear:     Ear canal: Normal bilaterally     Tympanic membrane: Intact and mobile bilaterally     Pinna: Normal bilaterally     Hearing:  Gross hearing assessment normal by voice    Nose:     Visualized using: Anterior rhinoscopy     " Nasopharynx: Inadequate mirror exam secondary to gag, anatomy.       Nasal dorsum: Nontraumatic midline appearance     Septum: Midline     Inferior turbinates: Normally sized     Mucosa: Bilateral, pink, normal appearing       ASSESSMENT/PLAN:  We discussed the FNA findings.  I offered her repeat FNA versus repeat ultrasound in 6 months and we agreed for repeat ultrasound.  She will call with any symptoms in the meantime.  Afrin trial for her snoring was suggested and she may call us if this is helpful        Ok Patrick MD       [1] History reviewed. No pertinent past medical history.  [2]   Current Outpatient Medications:     lisinopril 20 mg tablet, Take 1 tablet (20 mg) by mouth once daily., Disp: 90 tablet, Rfl: 1  [3]   Allergies  Allergen Reactions    Azithromycin GI Upset    Clarithromycin GI Upset and Rash    Sulfa (Sulfonamide Antibiotics) Unknown

## 2025-05-14 ENCOUNTER — HOSPITAL ENCOUNTER (OUTPATIENT)
Dept: RADIOLOGY | Facility: HOSPITAL | Age: 50
Discharge: HOME | End: 2025-05-14
Payer: COMMERCIAL

## 2025-05-14 DIAGNOSIS — Z12.31 ENCOUNTER FOR SCREENING MAMMOGRAM FOR MALIGNANT NEOPLASM OF BREAST: ICD-10-CM

## 2025-05-14 PROCEDURE — 77063 BREAST TOMOSYNTHESIS BI: CPT

## 2025-05-14 PROCEDURE — 77063 BREAST TOMOSYNTHESIS BI: CPT | Performed by: RADIOLOGY

## 2025-05-14 PROCEDURE — 77067 SCR MAMMO BI INCL CAD: CPT | Performed by: RADIOLOGY

## 2025-05-21 LAB
ALBUMIN SERPL-MCNC: 3.9 G/DL (ref 3.6–5.1)
ALP SERPL-CCNC: 70 U/L (ref 37–153)
ALT SERPL-CCNC: 10 U/L (ref 6–29)
ANION GAP SERPL CALCULATED.4IONS-SCNC: 11 MMOL/L (CALC) (ref 7–17)
AST SERPL-CCNC: 12 U/L (ref 10–35)
BILIRUB SERPL-MCNC: 0.4 MG/DL (ref 0.2–1.2)
BUN SERPL-MCNC: 10 MG/DL (ref 7–25)
CALCIUM SERPL-MCNC: 8.4 MG/DL (ref 8.6–10.4)
CHLORIDE SERPL-SCNC: 101 MMOL/L (ref 98–110)
CHOLEST SERPL-MCNC: 235 MG/DL
CHOLEST/HDLC SERPL: 6.4 (CALC)
CO2 SERPL-SCNC: 25 MMOL/L (ref 20–32)
CREAT SERPL-MCNC: 0.48 MG/DL (ref 0.5–1.03)
EGFRCR SERPLBLD CKD-EPI 2021: 115 ML/MIN/1.73M2
ERYTHROCYTE [DISTWIDTH] IN BLOOD BY AUTOMATED COUNT: 13.9 % (ref 11–15)
GLUCOSE SERPL-MCNC: 100 MG/DL (ref 65–99)
HCT VFR BLD AUTO: 42.5 % (ref 35–45)
HDLC SERPL-MCNC: 37 MG/DL
HGB BLD-MCNC: 13.8 G/DL (ref 11.7–15.5)
LDLC SERPL CALC-MCNC: 162 MG/DL (CALC)
MCH RBC QN AUTO: 28.9 PG (ref 27–33)
MCHC RBC AUTO-ENTMCNC: 32.5 G/DL (ref 32–36)
MCV RBC AUTO: 89.1 FL (ref 80–100)
NONHDLC SERPL-MCNC: 198 MG/DL (CALC)
PLATELET # BLD AUTO: 274 THOUSAND/UL (ref 140–400)
PMV BLD REES-ECKER: 11.3 FL (ref 7.5–12.5)
POTASSIUM SERPL-SCNC: 4.4 MMOL/L (ref 3.5–5.3)
PROT SERPL-MCNC: 6.5 G/DL (ref 6.1–8.1)
RBC # BLD AUTO: 4.77 MILLION/UL (ref 3.8–5.1)
SODIUM SERPL-SCNC: 137 MMOL/L (ref 135–146)
TRIGL SERPL-MCNC: 204 MG/DL
TSH SERPL-ACNC: 1.83 MIU/L
WBC # BLD AUTO: 7.1 THOUSAND/UL (ref 3.8–10.8)

## 2025-06-09 ENCOUNTER — OFFICE VISIT (OUTPATIENT)
Dept: PRIMARY CARE | Facility: CLINIC | Age: 50
End: 2025-06-09
Payer: COMMERCIAL

## 2025-06-09 VITALS
BODY MASS INDEX: 42.6 KG/M2 | HEART RATE: 84 BPM | WEIGHT: 280.2 LBS | SYSTOLIC BLOOD PRESSURE: 126 MMHG | DIASTOLIC BLOOD PRESSURE: 72 MMHG

## 2025-06-09 DIAGNOSIS — E78.00 HIGH CHOLESTEROL: ICD-10-CM

## 2025-06-09 DIAGNOSIS — I10 BENIGN ESSENTIAL HYPERTENSION: Primary | ICD-10-CM

## 2025-06-09 PROCEDURE — 3078F DIAST BP <80 MM HG: CPT | Performed by: FAMILY MEDICINE

## 2025-06-09 PROCEDURE — 3074F SYST BP LT 130 MM HG: CPT | Performed by: FAMILY MEDICINE

## 2025-06-09 PROCEDURE — 99214 OFFICE O/P EST MOD 30 MIN: CPT | Performed by: FAMILY MEDICINE

## 2025-06-09 ASSESSMENT — ENCOUNTER SYMPTOMS
COUGH: 0
DIZZINESS: 0
CHEST TIGHTNESS: 0
SHORTNESS OF BREATH: 0
HEADACHES: 0
ACTIVITY CHANGE: 0
PALPITATIONS: 0

## 2025-06-09 ASSESSMENT — PATIENT HEALTH QUESTIONNAIRE - PHQ9
1. LITTLE INTEREST OR PLEASURE IN DOING THINGS: NOT AT ALL
SUM OF ALL RESPONSES TO PHQ9 QUESTIONS 1 AND 2: 0
2. FEELING DOWN, DEPRESSED OR HOPELESS: NOT AT ALL

## 2025-06-09 ASSESSMENT — COLUMBIA-SUICIDE SEVERITY RATING SCALE - C-SSRS
1. IN THE PAST MONTH, HAVE YOU WISHED YOU WERE DEAD OR WISHED YOU COULD GO TO SLEEP AND NOT WAKE UP?: NO
6. HAVE YOU EVER DONE ANYTHING, STARTED TO DO ANYTHING, OR PREPARED TO DO ANYTHING TO END YOUR LIFE?: NO
2. HAVE YOU ACTUALLY HAD ANY THOUGHTS OF KILLING YOURSELF?: NO

## 2025-06-09 ASSESSMENT — PAIN SCALES - GENERAL: PAINLEVEL_OUTOF10: 0-NO PAIN

## 2025-06-09 NOTE — PATIENT INSTRUCTIONS
You need to walk every day  for 30-40 minutes to bring up the HDL.   Your cholesterol is high, so let's do the CT calcium score and figure disposition from there.     Send in the cologuard.

## 2025-06-09 NOTE — PROGRESS NOTES
Subjective   Patient ID: Liat Boyd is a 49 y.o. female who presents for Lab review.    HPI   She is here for regular follow-up.  I reviewed her lab test.  C BC is normal.  TSH is normal.  CMP is normal but her cholesterol is elevated with an LDL of 162 and a cholesterol HDL ratio of 6.4.  She had a CT calcium score back in 2017 which was 0.  She does have some heart issues in the family: dad has afib and an AAA; both brothers have had carotid surgery.   She has really done much exercise.  Her mom did a wheelchair and she states lately the spring she has been pushing around outside.    Review of Systems   Constitutional:  Negative for activity change.   Respiratory:  Negative for cough, chest tightness and shortness of breath.    Cardiovascular:  Negative for chest pain, palpitations and leg swelling.   Neurological:  Negative for dizziness and headaches.       Objective   /72   Pulse 84   Wt 127 kg (280 lb 3.2 oz)   BMI 42.60 kg/m²     Physical Exam  Constitutional:       Appearance: Normal appearance.   Neck:      Thyroid: No thyromegaly or thyroid tenderness.      Vascular: No carotid bruit.   Cardiovascular:      Rate and Rhythm: Normal rate and regular rhythm.      Heart sounds: No murmur heard.  Pulmonary:      Effort: Pulmonary effort is normal.      Breath sounds: Normal breath sounds.   Musculoskeletal:      Cervical back: Neck supple.   Neurological:      Mental Status: She is alert.   Psychiatric:         Mood and Affect: Mood normal.         Assessment/Plan   Diagnoses and all orders for this visit:  Benign essential hypertension  High cholesterol  -     CT cardiac scoring wo IV contrast; Future  She wants to avoid cholesterol medicines if she can.  Will check the CT calcium score and see if that remains at 0.  If that up then we will need to potentially treat the cholesterol.  Brothers have had carotid artery surgery so do have some vascular disease.  Blood pressure is well-controlled  so should continue the lisinopril.  I will see her back in 6 months.  She does have the Cologuard test at home and states she is going to do that next.  She is trying to do things 1 at a time.

## 2025-06-17 ENCOUNTER — HOSPITAL ENCOUNTER (OUTPATIENT)
Dept: RADIOLOGY | Facility: CLINIC | Age: 50
End: 2025-06-17
Payer: COMMERCIAL

## 2025-06-17 DIAGNOSIS — E78.00 HIGH CHOLESTEROL: ICD-10-CM

## 2025-06-17 PROCEDURE — 75571 CT HRT W/O DYE W/CA TEST: CPT

## 2025-11-05 ENCOUNTER — APPOINTMENT (OUTPATIENT)
Dept: OTOLARYNGOLOGY | Facility: CLINIC | Age: 50
End: 2025-11-05
Payer: COMMERCIAL